# Patient Record
Sex: MALE | Race: OTHER | Employment: FULL TIME | URBAN - METROPOLITAN AREA
[De-identification: names, ages, dates, MRNs, and addresses within clinical notes are randomized per-mention and may not be internally consistent; named-entity substitution may affect disease eponyms.]

---

## 2018-10-31 ENCOUNTER — HOSPITAL ENCOUNTER (OUTPATIENT)
Dept: GENERAL RADIOLOGY | Age: 33
Discharge: HOME | End: 2018-10-31
Attending: PHYSICAL MEDICINE & REHABILITATION
Payer: COMMERCIAL

## 2018-10-31 DIAGNOSIS — M54.42 LUMBAGO WITH SCIATICA, LEFT SIDE: ICD-10-CM

## 2018-10-31 PROCEDURE — 72110 X-RAY EXAM L-2 SPINE 4/>VWS: CPT | Performed by: PHYSICAL MEDICINE & REHABILITATION

## 2018-12-19 ENCOUNTER — HOSPITAL ENCOUNTER (OUTPATIENT)
Dept: MRI IMAGING | Age: 33
Discharge: HOME | End: 2018-12-19
Attending: PHYSICAL MEDICINE & REHABILITATION
Payer: COMMERCIAL

## 2018-12-19 DIAGNOSIS — M54.42 LUMBAGO WITH SCIATICA, LEFT SIDE: ICD-10-CM

## 2018-12-19 PROCEDURE — 72148 MRI LUMBAR SPINE W/O DYE: CPT | Performed by: PHYSICAL MEDICINE & REHABILITATION

## 2019-01-02 RX ORDER — MELOXICAM 15 MG/1
15 TABLET ORAL DAILY
Qty: 30 TABLET | Refills: 0 | Status: SHIPPED | OUTPATIENT
Start: 2019-01-02 | End: 2019-02-22 | Stop reason: ALTCHOICE

## 2019-01-02 NOTE — TELEPHONE ENCOUNTER
Mobic 15 mg #30 R0 pend to Dr Min Henry. Unable to access e clinical at this time. Request sent from [facility] pharmacy.

## 2019-01-02 NOTE — TELEPHONE ENCOUNTER
CAROLA with patient. [facility]Ob[facility] approved. We need to discuss his MRI results and come up with a plan for his HNP.

## 2019-02-22 ENCOUNTER — OFFICE VISIT (OUTPATIENT)
Dept: NEUROLOGY | Facility: CLINIC | Age: 34
End: 2019-02-22
Payer: COMMERCIAL

## 2019-02-22 VITALS
BODY MASS INDEX: 16.67 KG/M2 | WEIGHT: 110 LBS | SYSTOLIC BLOOD PRESSURE: 110 MMHG | HEIGHT: 68 IN | DIASTOLIC BLOOD PRESSURE: 62 MMHG | HEART RATE: 60 BPM

## 2019-02-22 DIAGNOSIS — M51.26 LUMBAR DISC HERNIATION: ICD-10-CM

## 2019-02-22 DIAGNOSIS — M54.16 LUMBAR RADICULOPATHY: Primary | ICD-10-CM

## 2019-02-22 PROCEDURE — 99214 OFFICE O/P EST MOD 30 MIN: CPT | Performed by: PHYSICAL MEDICINE & REHABILITATION

## 2019-02-22 NOTE — PROGRESS NOTES
[address]  Progress Note    CHIEF COMPLAINT:  Patient presents with:  Low Back Pain: Pt presents to clinic for consult lower back pain 1/10.  Had MRI lumbar spine . no pain meds, completed physical th Extra-occular movements intact. Heart: peripheral pulses intact. Normal capillary refill.    Extremities: No lower extremity edema bilaterally   Spine: full and painfree lumbar ROM in all directions  Hips: full and painfree ROM   Neuro:   Cognition: alert

## 2019-04-13 ENCOUNTER — APPOINTMENT (OUTPATIENT)
Dept: GENERAL RADIOLOGY | Age: 34
End: 2019-04-13
Attending: FAMILY MEDICINE
Payer: COMMERCIAL

## 2019-04-13 ENCOUNTER — HOSPITAL ENCOUNTER (OUTPATIENT)
Age: 34
Discharge: HOME | End: 2019-04-13
Attending: FAMILY MEDICINE
Payer: COMMERCIAL

## 2019-04-13 VITALS
BODY MASS INDEX: 18 KG/M2 | DIASTOLIC BLOOD PRESSURE: 64 MMHG | TEMPERATURE: 98 F | WEIGHT: 120 LBS | SYSTOLIC BLOOD PRESSURE: 106 MMHG | HEART RATE: 54 BPM | RESPIRATION RATE: 16 BRPM | OXYGEN SATURATION: 100 %

## 2019-04-13 DIAGNOSIS — S63.636A SPRAIN OF INTERPHALANGEAL JOINT OF RIGHT LITTLE FINGER, INITIAL ENCOUNTER: ICD-10-CM

## 2019-04-13 DIAGNOSIS — S63.259A DISLOCATION OF FINGER, INITIAL ENCOUNTER: Primary | ICD-10-CM

## 2019-04-13 PROCEDURE — 73140 X-RAY EXAM OF FINGER(S): CPT | Performed by: FAMILY MEDICINE

## 2019-04-13 PROCEDURE — 99213 OFFICE O/P EST LOW 20 MIN: CPT

## 2019-04-13 PROCEDURE — 29130 APPL FINGER SPLINT STATIC: CPT

## 2019-04-13 PROCEDURE — 99203 OFFICE O/P NEW LOW 30 MIN: CPT

## 2019-04-13 RX ORDER — IBUPROFEN 600 MG/1
600 TABLET ORAL ONCE
Status: COMPLETED | OUTPATIENT
Start: 2019-04-13 | End: 2019-04-13

## 2019-04-13 NOTE — ED PROVIDER NOTES
Patient Seen in: [id number] [facility]    History   Patient presents with:  Upper Extremity Injury (musculoskeletal)    Stated Complaint: right 5th finger injury    HPI    *49-year-old male presents to the immediate care today with chief adenopathy,no bruits  LUNGS: clear to auscultation  CARDIO: RRR without murmur  GI: good BS's,no masses, HSM or tenderness  EXTREMITIES: no cyanosis, clubbing or edema  Exam of R 5TH finger --> soft tissue swelling, tenderness and decreased range of motion follow-up provider specified. Medications Prescribed:  There are no discharge medications for this patient.

## 2019-04-13 NOTE — ED INITIAL ASSESSMENT (HPI)
Right 5th finger pain, ran into someone playing soccer, right 5th finger bended sideways, approx 45 degrees per patient, it was dislocated and he put it back in place, ice pack applied.

## 2020-03-25 ENCOUNTER — TELEPHONE (OUTPATIENT)
Dept: NEUROLOGY | Facility: CLINIC | Age: 35
End: 2020-03-25

## 2020-03-25 RX ORDER — CYCLOBENZAPRINE HCL 10 MG
10 TABLET ORAL NIGHTLY
Qty: 30 TABLET | Refills: 0 | Status: SHIPPED | OUTPATIENT
Start: 2020-03-25 | End: 2020-04-24

## 2020-03-25 RX ORDER — MELOXICAM 15 MG/1
15 TABLET ORAL DAILY
Qty: 30 TABLET | Refills: 0 | Status: SHIPPED | OUTPATIENT
Start: 2020-03-25

## 2020-03-25 NOTE — TELEPHONE ENCOUNTER
Spoke with patient, states he has worsened low back pain since yesterday, Last office visit with  2/22/2019, informed he would need to be seen as he has not been seen in a year, since yesterday c/o worsened low back pain, worse when bending, taking

## 2020-03-25 NOTE — TELEPHONE ENCOUNTER
I sent him meloxicam and Flexeril to take at night. If he is no better in 1 week, I could give him a Medrol dose pack.   We are trying to avoid steroids during the virus epidemic, but if he is having severe pain, I think he will be healthy enough to [employer]

## 2020-03-25 NOTE — TELEPHONE ENCOUNTER
Spoke with patient, notified of 's message, patient was understanding. Denied any travel in the past month and is not aware of anyone he has been around testing positive for coronavirus.  Denied any symptoms, states the only symptom he is having is

## 2020-04-16 NOTE — TELEPHONE ENCOUNTER
LMTCB    Medication request: Meloxicam 15mg 1 TAB QD    LOV: 02/22/19   NOV: none    [place] 03[place]25/20 #30 r-0

## 2020-04-20 RX ORDER — MELOXICAM 15 MG/1
TABLET ORAL
Qty: 30 TABLET | Refills: 0 | OUTPATIENT
Start: 2020-04-20

## 2020-04-20 NOTE — TELEPHONE ENCOUNTER
Spoke to patient. He states that he is taking meloxicam 1-2 times per week because his pain is much better. He states that he does not need a refill at this time. He prefers to schedule an in office appointment with Dr. Jazlyn Duvall but if his pain returns he will call back to telemedicine appointment.

## 2021-12-03 ENCOUNTER — TELEPHONE (OUTPATIENT)
Dept: PHYSICAL MEDICINE AND REHAB | Facility: CLINIC | Age: 36
End: 2021-12-03

## 2021-12-03 NOTE — TELEPHONE ENCOUNTER
Pt made an apt for 12/13 @ [facility] / Pt has back pain sense yesterday wants to know if can see him  sooner .    Please call to advise

## 2021-12-13 ENCOUNTER — OFFICE VISIT (OUTPATIENT)
Dept: PHYSICAL MEDICINE AND REHAB | Facility: CLINIC | Age: 36
End: 2021-12-13
Payer: COMMERCIAL

## 2021-12-13 VITALS — HEIGHT: 67 IN | BODY MASS INDEX: 20.4 KG/M2 | WEIGHT: 130 LBS

## 2021-12-13 DIAGNOSIS — M47.816 LUMBAR SPONDYLOSIS: Primary | ICD-10-CM

## 2021-12-13 PROCEDURE — 99214 OFFICE O/P EST MOD 30 MIN: CPT | Performed by: PHYSICAL MEDICINE & REHABILITATION

## 2021-12-13 PROCEDURE — 3008F BODY MASS INDEX DOCD: CPT | Performed by: PHYSICAL MEDICINE & REHABILITATION

## 2021-12-13 RX ORDER — MELOXICAM 15 MG/1
15 TABLET ORAL DAILY
Qty: 30 TABLET | Refills: 0 | Status: SHIPPED | OUTPATIENT
Start: 2021-12-13 | End: 2022-01-10

## 2021-12-13 RX ORDER — CYCLOBENZAPRINE HCL 10 MG
10 TABLET ORAL NIGHTLY
Qty: 30 TABLET | Refills: 0 | Status: SHIPPED | OUTPATIENT
Start: 2021-12-13 | End: 2022-01-12

## 2021-12-13 NOTE — PROGRESS NOTES
[address]  Progress Note    CHIEF COMPLAINT:  Patient presents with:  Back Pain: LOV 2/22/19 Pt comes in with back pain. Hurt his back again two weeks ago lifting something.  No Xrays, no injections, no PT. noted in the HPI. PHYSICAL EXAM:   Ht 67\"   Wt 130 lb (59 kg)   BMI 20.36 kg/m²     Body mass index is 20.36 kg/m².       General: No immediate distress  Extremities: No lower extremity edema bilaterally   Spine: Full range of motion but pain with e

## 2022-01-06 NOTE — TELEPHONE ENCOUNTER
Medication request: Meloxicam 15 MG Oral Tab  Sig:   Take 1 tablet (15 mg total) by mouth daily.     XVB:9/91/2345  WW:21/33/7590    [place]:12/14/2021 per pharmacy  Q- 30 R-0

## 2022-01-10 RX ORDER — MELOXICAM 15 MG/1
15 TABLET ORAL DAILY
Qty: 30 TABLET | Refills: 0 | Status: SHIPPED | OUTPATIENT
Start: 2022-01-10

## 2023-09-05 ENCOUNTER — OFFICE VISIT (OUTPATIENT)
Dept: SURGERY | Facility: CLINIC | Age: 38
End: 2023-09-05

## 2023-09-05 DIAGNOSIS — N50.812 PAIN IN LEFT TESTICLE: ICD-10-CM

## 2023-09-05 DIAGNOSIS — R82.90 URINE FINDING: ICD-10-CM

## 2023-09-05 DIAGNOSIS — N45.1 EPIDIDYMITIS: Primary | ICD-10-CM

## 2023-09-05 LAB
APPEARANCE: CLEAR
BILIRUBIN: NEGATIVE
GLUCOSE (URINE DIPSTICK): NEGATIVE MG/DL
KETONES (URINE DIPSTICK): NEGATIVE MG/DL
LEUKOCYTES: NEGATIVE
MULTISTIX LOT#: NORMAL NUMERIC
NITRITE, URINE: NEGATIVE
OCCULT BLOOD: NEGATIVE
PH, URINE: 5.5 (ref 4.5–8)
PROTEIN (URINE DIPSTICK): NEGATIVE MG/DL
SPECIFIC GRAVITY: 1.02 (ref 1–1.03)
URINE-COLOR: YELLOW
UROBILINOGEN,SEMI-QN: 0.2 MG/DL (ref 0–1.9)

## 2023-09-05 PROCEDURE — 81003 URINALYSIS AUTO W/O SCOPE: CPT

## 2023-09-05 PROCEDURE — 99203 OFFICE O/P NEW LOW 30 MIN: CPT

## 2023-09-05 RX ORDER — SULFAMETHOXAZOLE AND TRIMETHOPRIM 800; 160 MG/1; MG/1
1 TABLET ORAL 2 TIMES DAILY
Qty: 28 TABLET | Refills: 0 | Status: SHIPPED | OUTPATIENT
Start: 2023-09-05

## 2023-09-05 NOTE — PROGRESS NOTES
[facility] [address], [address] [facility]    Urology Consult Note    History of Present Illness:   Patient is a(n) 34year old male with hx of vasectomy in 2021 that presents for left testicular pain. Pt c/o intermittent testicular pain since 1yr ago that flares each time for 1-2 days. Usually not bothersome and goes away without intervention. Describes it as dull/achey. No a/a factors. No particular trigger first time he noticed it. Most recent flare, he noticed there was some swelling to the top of the left testicle. Unsure if this occurs with each flare as he has never checked.  and tries to alternate standing and sitting. Drinks 64oz water a day, lots of coffee. UA neg. AUA score: I: 0/5 F: 0/5 I: 0/5 U: 0/5 W: 0/5 S: 0/5 N: 0/5 total: 0/35    HISTORY:  No past medical history on file. Past Surgical History:   Procedure Laterality Date    VASECTOMY N/A 2021      No family history on file. Social History:   Social History     Socioeconomic History    Marital status:    Tobacco Use    Smoking status: Never    Smokeless tobacco: Never   Substance and Sexual Activity    Alcohol use: Yes    Drug use: No        Allergies    Pollen                  OTHER (SEE COMMENTS)    Comment:Sinus congestion    Review of Systems:   A 10-point review of systems was completed and is negative other than as noted above. Physical Exam:   There were no vitals taken for this visit.     GENERAL APPEARANCE: no acute distress  NEUROLOGIC: converses appropriately  HEAD: atraumatic, normocephalic  LUNGS: non-labored breathing  ABDOMEN: soft, nontender, non-distended  BACK: no CVA tenderness  PSYCH: appropriate affect and mood  INGUINAL CANALS: no hernias  PENILE MEATUS: open and in normal location  PENIS: normal, uncircumsized  SCROTUM: normal, no varicocele  TESTES: normal anatomy  EPIDIDYMIS: normal anatomy, L>R, possible cyst on left side      Results:     Laboratory Data:  No results found for: WBC, HGB, PLT  No results found for: NA, K, CL, CO2, BUN, CREATININE, GLU, GFRAA, AST, ALT, TP, ALB, PHOS, CA, MG    Urinalysis Results (last 3 years):  Recent Labs     09/05/23  0952   SPECGRAVITY 1.025   PHURINE 5.5   NITRITE Negative       Urine Culture Results (last 3 years):  No results found for: URINECUL    Imaging  No results found. Impression:   Recommendations:  Epididymitis  - epididymitis and testicular pain treatment and prevention strategies provided along educational materials  - recommend he drink plenty of water (enough to keep urine clear)  - pt should wear an athletic supporter and avoid activities which may exacerbate pain such as sitting for prolonged periods of time, riding a bike/lawnmower/tractor, heavy lifting, hot baths/hot packs  - provided he has no medical contraindications to NSAIDs, I also suggested he try this up to twice a day PRN  - discussed a 2 week course of abx for epididymitis and he would like to do this  - scrotal US to r/o other abnormalities  - vikor UTI STI PCR to r/o persisting infxn      Thank you very much for this consult. Please call if there are any questions or concerns.      Carmen Veloz PA-C  Urology  [facility] [address]  Phone: [phone]    Date: 9/5/2023  Time: 10:13 AM

## 2023-09-07 ENCOUNTER — HOSPITAL ENCOUNTER (OUTPATIENT)
Dept: ULTRASOUND IMAGING | Age: 38
Discharge: HOME | End: 2023-09-07
Payer: COMMERCIAL

## 2023-09-07 DIAGNOSIS — N50.812 PAIN IN LEFT TESTICLE: ICD-10-CM

## 2023-09-07 PROCEDURE — 76870 US EXAM SCROTUM: CPT

## 2023-09-07 PROCEDURE — 93975 VASCULAR STUDY: CPT

## 2023-09-08 ENCOUNTER — TELEPHONE (OUTPATIENT)
Dept: SURGERY | Facility: CLINIC | Age: 38
End: 2023-09-08

## 2024-07-10 ENCOUNTER — TELEPHONE (OUTPATIENT)
Dept: SURGERY | Facility: CLINIC | Age: 39
End: 2024-07-10

## 2024-07-10 NOTE — TELEPHONE ENCOUNTER
For Documentation Purposes;    RN faxed the office notes on 9/5/23 to [facility] for insurance purposes.

## (undated) NOTE — LETTER
[address] [place], [place] [address]PHYSIATR   Date:   12/13/2021     Name:   Madhavi Hurd    YOB: 1985   MRN:   [id number]       [facility]?   Manuel the areas on your body where you feel the describ